# Patient Record
Sex: FEMALE | Race: BLACK OR AFRICAN AMERICAN | NOT HISPANIC OR LATINO | Employment: FULL TIME | URBAN - METROPOLITAN AREA
[De-identification: names, ages, dates, MRNs, and addresses within clinical notes are randomized per-mention and may not be internally consistent; named-entity substitution may affect disease eponyms.]

---

## 2017-01-13 ENCOUNTER — LAB VISIT (OUTPATIENT)
Dept: LAB | Facility: HOSPITAL | Age: 57
End: 2017-01-13
Attending: INTERNAL MEDICINE
Payer: COMMERCIAL

## 2017-01-13 DIAGNOSIS — I10 ESSENTIAL HYPERTENSION: ICD-10-CM

## 2017-01-13 DIAGNOSIS — Z95.1 S/P CABG X 2: ICD-10-CM

## 2017-01-13 DIAGNOSIS — I25.10 CAD, MULTIPLE VESSEL: ICD-10-CM

## 2017-01-13 DIAGNOSIS — E78.5 HYPERLIPIDEMIA, UNSPECIFIED HYPERLIPIDEMIA TYPE: ICD-10-CM

## 2017-01-13 LAB
ALBUMIN SERPL BCP-MCNC: 3.3 G/DL
ALP SERPL-CCNC: 133 U/L
ALT SERPL W/O P-5'-P-CCNC: 12 U/L
ANION GAP SERPL CALC-SCNC: 6 MMOL/L
AST SERPL-CCNC: 21 U/L
BASOPHILS # BLD AUTO: 0.01 K/UL
BASOPHILS NFR BLD: 0.2 %
BILIRUB SERPL-MCNC: 0.2 MG/DL
BUN SERPL-MCNC: 39 MG/DL
CALCIUM SERPL-MCNC: 9.4 MG/DL
CHLORIDE SERPL-SCNC: 115 MMOL/L
CHOLEST/HDLC SERPL: 3 {RATIO}
CO2 SERPL-SCNC: 21 MMOL/L
CREAT SERPL-MCNC: 1.4 MG/DL
DIFFERENTIAL METHOD: ABNORMAL
EOSINOPHIL # BLD AUTO: 0.1 K/UL
EOSINOPHIL NFR BLD: 2 %
ERYTHROCYTE [DISTWIDTH] IN BLOOD BY AUTOMATED COUNT: 14.5 %
EST. GFR  (AFRICAN AMERICAN): 48.8 ML/MIN/1.73 M^2
EST. GFR  (NON AFRICAN AMERICAN): 42.3 ML/MIN/1.73 M^2
ESTIMATED AVG GLUCOSE: 163 MG/DL
GLUCOSE SERPL-MCNC: 75 MG/DL
HBA1C MFR BLD HPLC: 7.3 %
HCT VFR BLD AUTO: 34.1 %
HDL/CHOLESTEROL RATIO: 32.9 %
HDLC SERPL-MCNC: 152 MG/DL
HDLC SERPL-MCNC: 50 MG/DL
HGB BLD-MCNC: 10.8 G/DL
LDLC SERPL CALC-MCNC: 89.8 MG/DL
LYMPHOCYTES # BLD AUTO: 2.5 K/UL
LYMPHOCYTES NFR BLD: 42.2 %
MCH RBC QN AUTO: 26.6 PG
MCHC RBC AUTO-ENTMCNC: 31.7 %
MCV RBC AUTO: 84 FL
MONOCYTES # BLD AUTO: 0.5 K/UL
MONOCYTES NFR BLD: 8.3 %
NEUTROPHILS # BLD AUTO: 2.8 K/UL
NEUTROPHILS NFR BLD: 47.1 %
NONHDLC SERPL-MCNC: 102 MG/DL
PLATELET # BLD AUTO: 199 K/UL
PMV BLD AUTO: 11.2 FL
POTASSIUM SERPL-SCNC: 5.3 MMOL/L
PROT SERPL-MCNC: 7.8 G/DL
RBC # BLD AUTO: 4.06 M/UL
SODIUM SERPL-SCNC: 142 MMOL/L
TRIGL SERPL-MCNC: 61 MG/DL
WBC # BLD AUTO: 5.9 K/UL

## 2017-01-13 PROCEDURE — 80061 LIPID PANEL: CPT

## 2017-01-13 PROCEDURE — 36415 COLL VENOUS BLD VENIPUNCTURE: CPT

## 2017-01-13 PROCEDURE — 80053 COMPREHEN METABOLIC PANEL: CPT

## 2017-01-13 PROCEDURE — 83036 HEMOGLOBIN GLYCOSYLATED A1C: CPT

## 2017-01-13 PROCEDURE — 85025 COMPLETE CBC W/AUTO DIFF WBC: CPT

## 2017-01-25 ENCOUNTER — TELEPHONE (OUTPATIENT)
Dept: CARDIOLOGY | Facility: CLINIC | Age: 57
End: 2017-01-25

## 2017-01-25 DIAGNOSIS — I25.10 CORONARY ARTERY DISEASE, ANGINA PRESENCE UNSPECIFIED, UNSPECIFIED VESSEL OR LESION TYPE, UNSPECIFIED WHETHER NATIVE OR TRANSPLANTED HEART: Primary | ICD-10-CM

## 2017-01-25 NOTE — TELEPHONE ENCOUNTER
----- Message from Fabiana Merchant MD sent at 1/13/2017 10:11 AM CST -----  Potassium is slightly elevated. Please ask her to avoid high potassium foods such as bananas, potatoes and citrus fruits. She will need a repeat BMP prior to her appointment with Dr Hernandez on 2/3. If her potassium remains elevated, we may need to decrease or discontinue her lisinopril.

## 2017-02-10 ENCOUNTER — OFFICE VISIT (OUTPATIENT)
Dept: CARDIOLOGY | Facility: CLINIC | Age: 57
End: 2017-02-10
Payer: COMMERCIAL

## 2017-02-10 ENCOUNTER — TELEPHONE (OUTPATIENT)
Dept: CARDIOLOGY | Facility: CLINIC | Age: 57
End: 2017-02-10

## 2017-02-10 ENCOUNTER — LAB VISIT (OUTPATIENT)
Dept: LAB | Facility: HOSPITAL | Age: 57
End: 2017-02-10
Attending: INTERNAL MEDICINE
Payer: COMMERCIAL

## 2017-02-10 VITALS
HEIGHT: 68 IN | WEIGHT: 205 LBS | DIASTOLIC BLOOD PRESSURE: 83 MMHG | BODY MASS INDEX: 31.07 KG/M2 | SYSTOLIC BLOOD PRESSURE: 187 MMHG | HEART RATE: 76 BPM

## 2017-02-10 DIAGNOSIS — I25.10 CAD, MULTIPLE VESSEL: ICD-10-CM

## 2017-02-10 DIAGNOSIS — I10 ESSENTIAL HYPERTENSION: Primary | ICD-10-CM

## 2017-02-10 DIAGNOSIS — E87.5 HYPERKALEMIA: Primary | ICD-10-CM

## 2017-02-10 DIAGNOSIS — I25.10 CORONARY ARTERY DISEASE, ANGINA PRESENCE UNSPECIFIED, UNSPECIFIED VESSEL OR LESION TYPE, UNSPECIFIED WHETHER NATIVE OR TRANSPLANTED HEART: ICD-10-CM

## 2017-02-10 DIAGNOSIS — E78.5 HYPERLIPIDEMIA, UNSPECIFIED HYPERLIPIDEMIA TYPE: ICD-10-CM

## 2017-02-10 LAB
ANION GAP SERPL CALC-SCNC: 4 MMOL/L
BUN SERPL-MCNC: 27 MG/DL
CALCIUM SERPL-MCNC: 9.5 MG/DL
CHLORIDE SERPL-SCNC: 115 MMOL/L
CO2 SERPL-SCNC: 21 MMOL/L
CREAT SERPL-MCNC: 1.3 MG/DL
EST. GFR  (AFRICAN AMERICAN): 53.4 ML/MIN/1.73 M^2
EST. GFR  (NON AFRICAN AMERICAN): 46.3 ML/MIN/1.73 M^2
GLUCOSE SERPL-MCNC: 144 MG/DL
POTASSIUM SERPL-SCNC: 6.1 MMOL/L
SODIUM SERPL-SCNC: 140 MMOL/L

## 2017-02-10 PROCEDURE — 99999 PR PBB SHADOW E&M-EST. PATIENT-LVL III: CPT | Mod: PBBFAC,,, | Performed by: INTERNAL MEDICINE

## 2017-02-10 PROCEDURE — 3077F SYST BP >= 140 MM HG: CPT | Mod: S$GLB,,, | Performed by: INTERNAL MEDICINE

## 2017-02-10 PROCEDURE — 99213 OFFICE O/P EST LOW 20 MIN: CPT | Mod: S$GLB,,, | Performed by: INTERNAL MEDICINE

## 2017-02-10 PROCEDURE — 3079F DIAST BP 80-89 MM HG: CPT | Mod: S$GLB,,, | Performed by: INTERNAL MEDICINE

## 2017-02-10 PROCEDURE — 36415 COLL VENOUS BLD VENIPUNCTURE: CPT

## 2017-02-10 PROCEDURE — 80048 BASIC METABOLIC PNL TOTAL CA: CPT

## 2017-02-10 RX ORDER — INSULIN LISPRO 100 [IU]/ML
INJECTION, SOLUTION INTRAVENOUS; SUBCUTANEOUS
Qty: 1 BOX | Refills: 6 | Status: CANCELLED | OUTPATIENT
Start: 2017-02-10

## 2017-02-10 RX ORDER — HYDROCHLOROTHIAZIDE 25 MG/1
25 TABLET ORAL DAILY
Qty: 90 TABLET | Refills: 3 | Status: SHIPPED | OUTPATIENT
Start: 2017-02-10 | End: 2022-07-31 | Stop reason: SDUPTHER

## 2017-02-10 NOTE — MR AVS SNAPSHOT
[facility]  [address]  [facility] [address]  Phone: [phone]                  Ce Alicea   2/10/2017 1:00 PM   Office Visit    Description:  Female : 1960   Provider:  Jaden Fink MD   Department:  [facility]           Reason for Visit     Coronary Artery Disease           Diagnoses this Visit        Comments    Essential hypertension    -  Primary     CAD, multiple vessel         Hyperlipidemia, unspecified hyperlipidemia type                To Do List           Future Appointments        Provider Department Dept Phone    2/10/2017 2:25 PM LAB, APPOINTMENT [place] [facility] [phone]    3/31/2017 9:30 AM ECHO, [facility] - Echo/Stress [facility] [phone]    3/31/2017 1:00 PM MD [facility] [phone]      Goals (5 Years of Data)     None      Follow-Up and Disposition     Return in about 4 weeks (around 3/10/2017).    Follow-up and Disposition History       These Medications        Disp Refills Start End    hydrochlorothiazide (HYDRODIURIL) 25 MG tablet 90 tablet 3 2/10/2017 2/10/2018    Take 1 tablet (25 mg total) by mouth once daily. - Oral    Pharmacy: [facility] JOSE DANIEL [address] #: [phone]         [facility]     [facility] Nurse Care Line -  Assistance  Registered nurses in the [facility] Center provide clinical advisement, health education, appointment booking, and other advisory services.  Call for this free service at [phone].             Medications           Message regarding Medications     Verify the changes and/or additions to your medication regime listed below are the same as discussed with your clinician today.  If any of these changes or additions are incorrect, please notify your healthcare provider.        START taking these NEW medications        Refills    hydrochlorothiazide  "(HYDRODIURIL) 25 MG tablet 3    Sig: Take 1 tablet (25 mg total) by mouth once daily.    Class: Normal    Route: Oral           Verify that the below list of medications is an accurate representation of the medications you are currently taking.  If none reported, the list may be blank. If incorrect, please contact your healthcare provider. Carry this list with you in case of emergency.           Current Medications     amlodipine (NORVASC) 10 MG tablet Take 1 tablet (10 mg total) by mouth once daily.    aspirin (ECOTRIN) 81 MG EC tablet Take 1 tablet (81 mg total) by mouth once daily.    atorvastatin (LIPITOR) 80 MG tablet Take 1 tablet (80 mg total) by mouth once daily. DO NOT TAKE UNTIL FOLLOW UP WITH HEPATOLOGY.    blood sugar diagnostic (ONETOUCH VERIO) Strp 1 strip by Misc.(Non-Drug; Combo Route) route 3 (three) times daily.    carvedilol (COREG) 3.125 MG tablet Take 1 tablet (3.125 mg total) by mouth 2 (two) times daily.    HUMALOG KWIKPEN 100 unit/mL InPn pen INJECT 4 UNITS INTO THE SKIN WITH EACH MEAL PLUS CORRECTION SCALE (180-230+1, 231-280+2, 281-330+3, 331-380+4, > 380+5    lancets (ONETOUCH DELICA LANCETS) 33 gauge Misc 1 lancet by Misc.(Non-Drug; Combo Route) route 3 (three) times daily.    LEVEMIR FLEXTOUCH 100 unit/mL (3 mL) InPn pen INJECT 20 UNITS SUB-Q ONCE DAILY IN THE EVENING    lisinopril (PRINIVIL,ZESTRIL) 20 MG tablet Take 1 tablet (20 mg total) by mouth 2 (two) times daily.    pen needle, diabetic (BD ULTRA-FINE PEDRO PEN NEEDLES) 32 gauge x 5/32" Ndle 1 Device by Misc.(Non-Drug; Combo Route) route 4 (four) times daily before meals and nightly.    hydrochlorothiazide (HYDRODIURIL) 25 MG tablet Take 1 tablet (25 mg total) by mouth once daily.           Clinical Reference Information           Your Vitals Were     BP Pulse Height Weight Last Period BMI    187/83 (BP Location: Left arm, Patient Position: Sitting, BP Method: Automatic) 76 5' 8" (1.727 m) 93 kg (205 lb 0.4 oz) (LMP Unknown) 31.17 " kg/m2      Blood Pressure          Most Recent Value    Right Arm BP - Sitting  187/81    Left Arm BP - Sitting  187/83    BP  (!)  187/83      Allergies as of 2/10/2017     Pneumococcal 23-gabriela Ps Vaccine      Immunizations Administered on Date of Encounter - 2/10/2017     None      Orders Placed During Today's Visit     Future Labs/Procedures Expected by Expires    2D Echo w/ Color Flow Doppler  As directed 2/10/2018      [facility] Sign-Up     Activating your [facility] account is as easy as 1-2-3!     1) Visit [email or web address], select Sign Up Now, enter this activation code and your date of birth, then select Next.  [email or web address]  Expires: 2/13/2017  1:50 PM      2) Create a username and password to use when you visit [facility] in the future and select a security question in case you lose your password and select Next.    3) Enter your e-mail address and click Sign Up!    Additional Information  If you have questions, please e-mail [email or web address] or call [phone] to talk to our [facility] staff. Remember, [facility] is NOT to be used for urgent needs. For medical emergencies, dial 911.         Instructions    It was nice seeing you today. I have added HCTZ 25 mg Daily to your regimen and have sent to prescription to your pharmacy. Also have your BMP drawn as we discussed. In addition, I have ordered an echocardiogram, please have this done before your next clinic visit with me.    Also remember to bring your blood pressure cuff and log with you at your next clinic visit.       Language Assistance Services     ATTENTION: Language assistance services are available, free of charge. Please call [phone].      ATENCIÓN: Si habla español, tiene a reinoso disposición servicios gratuitos de asistencia lingüística. Llame al [phone].     DAWOOD Ý: N?u b?n nói Ti?ng Vi?t, có các d?ch v? h? tr? ngôn ng? mi?n phí dành cho b?n. G?i s? [phone].         [facility] complies with applicable Federal  civil rights laws and does not discriminate on the basis of race, color, national origin, age, disability, or sex.

## 2017-02-10 NOTE — PATIENT INSTRUCTIONS
It was nice seeing you today. I have added HCTZ 25 mg Daily to your regimen and have sent to prescription to your pharmacy. Also have your BMP drawn as we discussed. In addition, I have ordered an echocardiogram, please have this done before your next clinic visit with me.    Also remember to bring your blood pressure cuff and log with you at your next clinic visit.

## 2017-02-10 NOTE — TELEPHONE ENCOUNTER
----- Message from Janie Davies sent at 2/10/2017  2:51 PM CST -----  Contact: Patient  Patient need a Rx refill for HUMALOG KWIKPEN 100 unit/mL InPn pen    Please send Rx to [facility] [phone] (Phone)  [phone] (Fax)    [facility]    Please call patient at [phone]

## 2017-02-10 NOTE — TELEPHONE ENCOUNTER
I called patient this afternoon after her clinic visit with me. Patient had a BMP drawn after seeing me, K came back at 6.1, higher than previous drawn last month at 5.3. Advised to stop lisinopril this weekend, avoid foods with high potassium. Patient will continue all other meds, will re-check BMP on Monday.    I will follow up with her next week after her BMP to determine an alternative regimen for her blood pressure.    Signed:  Jaden Fink MD  Cardiology Fellow - PGY4  Pager: [phone]  2/10/2017 5:09 PM

## 2017-02-10 NOTE — PROGRESS NOTES
I have personally taken the history and examined this patient and agree with the Fellow's note as stated above.    Needs better bp control if she can consistently walk into doctor's office with bp in 180 range.  Would add hctz 25 - if bp still up, then titrate carvedilol upward.

## 2017-02-10 NOTE — PROGRESS NOTES
Cardiology Clinic Note  Reason for Visit: Routine follow-up    HPI:   Patient is a 55 y/o F w/ PMHx CAD s/p 2V CABG in  (LIMA-LAD, SVG-PDA), NSTEMI s/p PCI with TIM to mid RCA in 2015, HTN, DM2, who presents to our clinic for routine follow up. Patient last saw me in clinic in late 2016.     Her B/P is still elevated today at 187/81, but she states that it was 130-140/60 at home this morning. Previous clinic visits also show elevated B/P, and plan was for her to bring in her B/P log and cuff from home to compare to the office cuff. She has forgotten to bring both today. Labs last month showed an elevated K to 5.3, but she has not had a repeat BMP after being advised by Dr. Merchant to have one drawn before her visit with me today.     Patient states she feels well otherwise, denies any recent chest discomfort, shortness of breath, palpitations, PND, or orthopnea. She has no limitation with exertion. She denies any dizziness or headaches.     Her medications include Coreg, Norvasc, and Lisinopril for HTN, and she has been taking ASA. I stopped her Brilinta at her last clinic visit as it had been more than a year since her TIM and she had stopped taking the Brilinta several months prior.      ROS:    Constitution: Negative for fever, chills, weight loss or gain.   HENT: Negative for sore throat, rhinorrhea, or headache.  Eyes: Negative for blurred or double vision.   Cardiovascular: See above  Pulmonary: Negative for SOB   Gastrointestinal: Negative for abdominal pain, nausea, vomiting, or diarrhea.   : Negative for dysuria.   Neurological: Negative for focal weakness or sensory changes.  PMH:     Past Medical History   Diagnosis Date    CAD (coronary artery disease)     CAD, multiple vessel 10/28/2014    Diabetes mellitus     Encounter for blood transfusion     Hyperlipidemia     Hypertension      Past Surgical History   Procedure Laterality Date     section      Coronary artery  "bypass graft  12/2014     x 2    Cardiac surgery      Cardiac catheterization  05/22/15      x 1     Allergies:     Review of patient's allergies indicates:   Allergen Reactions    Pneumococcal 23-gabriela ps vaccine      Medications:     Current Outpatient Prescriptions on File Prior to Visit   Medication Sig Dispense Refill    amlodipine (NORVASC) 10 MG tablet Take 1 tablet (10 mg total) by mouth once daily. 90 tablet 3    aspirin (ECOTRIN) 81 MG EC tablet Take 1 tablet (81 mg total) by mouth once daily.  0    atorvastatin (LIPITOR) 80 MG tablet Take 1 tablet (80 mg total) by mouth once daily. DO NOT TAKE UNTIL FOLLOW UP WITH HEPATOLOGY. 90 tablet 3    blood sugar diagnostic (ONETOUCH VERIO) Strp 1 strip by Misc.(Non-Drug; Combo Route) route 3 (three) times daily. 200 strip 12    carvedilol (COREG) 3.125 MG tablet Take 1 tablet (3.125 mg total) by mouth 2 (two) times daily. 180 tablet 3    HUMALOG KWIKPEN 100 unit/mL InPn pen INJECT 4 UNITS INTO THE SKIN WITH EACH MEAL PLUS CORRECTION SCALE (180-230+1, 231-280+2, 281-330+3, 331-380+4, > 380+5 1 Box 6    lancets (ONETOUCH DELICA LANCETS) 33 gauge Misc 1 lancet by Misc.(Non-Drug; Combo Route) route 3 (three) times daily. 200 each 12    LEVEMIR FLEXTOUCH 100 unit/mL (3 mL) InPn pen INJECT 20 UNITS SUB-Q ONCE DAILY IN THE EVENING 1 Box 0    lisinopril (PRINIVIL,ZESTRIL) 20 MG tablet Take 1 tablet (20 mg total) by mouth 2 (two) times daily. 180 tablet 3    pen needle, diabetic (BD ULTRA-FINE PEDRO PEN NEEDLES) 32 gauge x 5/32" Ndle 1 Device by Misc.(Non-Drug; Combo Route) route 4 (four) times daily before meals and nightly. 200 each 6     No current facility-administered medications on file prior to visit.      Social History:     Social History   Substance Use Topics    Smoking status: Never Smoker    Smokeless tobacco: Not on file    Alcohol use No     Family History:     Family History   Problem Relation Age of Onset    Diabetes Mother     Hypertension " "Mother     Diabetes Brother     Heart disease Neg Hx     Heart attack Neg Hx     Cirrhosis Neg Hx      Physical Exam:     Visit Vitals    BP (!) 187/83 (BP Location: Left arm, Patient Position: Sitting, BP Method: Automatic)    Pulse 76    Ht 5' 8" (1.727 m)    Wt 93 kg (205 lb 0.4 oz)    LMP  (LMP Unknown)    BMI 31.17 kg/m2      Constitutional: NAD, conversant  HEENT: Sclera anicteric, PERRLA, EOMI  Neck: No JVD, no carotid bruits  CV: RRR, 2/6 systolic ejection murmur heard best along RSB, also has a prominent heave along her sternum, normal S1/S2  Pulm: CTAB, no wheezes, rales, or ronchi  GI: Abdomen soft, NTND, +BS  Extremities: Trace LE edema, warm and well perfused  Skin: No ecchymosis, erythema, or ulcers  Psych: AOx3, appropriate affect  Neuro: CNII-XII intact, no focal deficits    Labs:     Lab Results   Component Value Date     01/13/2017    K 5.3 (H) 01/13/2017     (H) 01/13/2017    CO2 21 (L) 01/13/2017    BUN 39 (H) 01/13/2017    CREATININE 1.4 01/13/2017    ANIONGAP 6 (L) 01/13/2017     Lab Results   Component Value Date    HGBA1C 7.3 (H) 01/13/2017     Lab Results   Component Value Date     (H) 10/17/2015     (H) 10/26/2014    Lab Results   Component Value Date    WBC 5.90 01/13/2017    HGB 10.8 (L) 01/13/2017    HCT 34.1 (L) 01/13/2017    HCT 19 (LL) 12/01/2014     01/13/2017    GRAN 2.8 01/13/2017    GRAN 47.1 01/13/2017     Lab Results   Component Value Date    CHOL 152 01/13/2017    HDL 50 01/13/2017    LDLCALC 89.8 01/13/2017    TRIG 61 01/13/2017          Imaging:     EF   Date Value Ref Range Status   10/19/2015 60 55 - 65    12/02/2014 60 55 - 65    10/27/2014 55 55 - 65      Assessment:   Patient is a 57 y/o F w/ PMHx CAD s/p 2V CABG in 2014 (LIMA-LAD, SVG-PDA), NSTEMI s/p PCI with TIM to mid RCA in 5/2015, HTN, HLD, DM2, who presents to our clinic for routine follow up.     1. CAD s/p 2V CABG in 2014(LIMA-LAD, SVG-PDA), PCI with TIM to mid RCA in " 5/2015; currently taking ASA 81 mg Daily and Lipitor 80 mg Daily; EF preserved per TTE in 10/2015  2. HTN, elevated today in clinic, asymptomatic; currently on Coreg 3.125 mg BID, Lisinopril 20 mg BID, and Norvasc 10 mg Daily  3. HLD, on Lipitor as above  4. DM2 on insulin    Plan:   1. Check BMP as previously requested.  2. Will check TTE as it has been > 1 year since last, given CAD history, peristently elevated B/P, and murmur on exam  3. Will add HCTZ 25 mg Daily to her regimen.  4. Continue other medications as above.  5. Request blood pressure log and cuff to be brought to next clinic appointment to determine efficacy of HTN treatement.    Follow up in 1 month. Discussed with Dr. Arreguin.    Signed:  Jaden Fink MD  Cardiology Fellow, PGY-4  Pager: [phone]  2/10/2017 1:53 PM

## 2017-02-13 RX ORDER — INSULIN LISPRO 100 [IU]/ML
INJECTION, SOLUTION INTRAVENOUS; SUBCUTANEOUS
Qty: 1 BOX | Refills: 6 | Status: SHIPPED | OUTPATIENT
Start: 2017-02-13 | End: 2022-07-31 | Stop reason: SDUPTHER

## 2018-03-09 RX ORDER — CARVEDILOL 3.12 MG/1
3.12 TABLET ORAL 2 TIMES DAILY
Qty: 180 TABLET | Refills: 3 | Status: SHIPPED | OUTPATIENT
Start: 2018-03-09 | End: 2022-07-31 | Stop reason: SDUPTHER

## 2018-03-20 ENCOUNTER — TELEPHONE (OUTPATIENT)
Dept: CARDIOLOGY | Facility: CLINIC | Age: 58
End: 2018-03-20

## 2018-03-21 NOTE — TELEPHONE ENCOUNTER
Asked the pt to please call the office back b/c her [facility] is trying to fill the Lisinopril Rx yet it has been discontinued.Noticed when  had discontinued it he had asked that she get labs the following Mon and It does not she has been back since.

## 2020-03-27 ENCOUNTER — HOSPITAL ENCOUNTER (EMERGENCY)
Facility: HOSPITAL | Age: 60
Discharge: HOME | End: 2020-03-27
Attending: EMERGENCY MEDICINE
Payer: OTHER GOVERNMENT

## 2020-03-27 VITALS
OXYGEN SATURATION: 98 % | DIASTOLIC BLOOD PRESSURE: 94 MMHG | HEART RATE: 80 BPM | RESPIRATION RATE: 16 BRPM | TEMPERATURE: 99 F | WEIGHT: 200 LBS | HEIGHT: 68 IN | BODY MASS INDEX: 30.31 KG/M2 | SYSTOLIC BLOOD PRESSURE: 212 MMHG

## 2020-03-27 DIAGNOSIS — I10 HYPERTENSION, UNSPECIFIED TYPE: ICD-10-CM

## 2020-03-27 DIAGNOSIS — B34.9 ACUTE VIRAL SYNDROME: ICD-10-CM

## 2020-03-27 DIAGNOSIS — Z20.822 SUSPECTED COVID-19 VIRUS INFECTION: ICD-10-CM

## 2020-03-27 DIAGNOSIS — R05.9 COUGH: ICD-10-CM

## 2020-03-27 DIAGNOSIS — R50.9 FEVER, UNSPECIFIED FEVER CAUSE: Primary | ICD-10-CM

## 2020-03-27 PROCEDURE — 99284 EMERGENCY DEPT VISIT MOD MDM: CPT | Mod: ,,, | Performed by: PHYSICIAN ASSISTANT

## 2020-03-27 PROCEDURE — 25000003 PHARM REV CODE 250: Performed by: PHYSICIAN ASSISTANT

## 2020-03-27 PROCEDURE — 99284 PR EMERGENCY DEPT VISIT,LEVEL IV: ICD-10-PCS | Mod: ,,, | Performed by: PHYSICIAN ASSISTANT

## 2020-03-27 PROCEDURE — U0002 COVID-19 LAB TEST NON-CDC: HCPCS

## 2020-03-27 PROCEDURE — 99284 EMERGENCY DEPT VISIT MOD MDM: CPT | Mod: 25

## 2020-03-27 RX ORDER — ACETAMINOPHEN 500 MG
1000 TABLET ORAL
Status: COMPLETED | OUTPATIENT
Start: 2020-03-27 | End: 2020-03-27

## 2020-03-27 RX ORDER — AMLODIPINE BESYLATE 10 MG/1
10 TABLET ORAL DAILY
Qty: 30 TABLET | Refills: 0 | Status: SHIPPED | OUTPATIENT
Start: 2020-03-27 | End: 2022-07-31 | Stop reason: SDUPTHER

## 2020-03-27 RX ORDER — AMLODIPINE BESYLATE 10 MG/1
10 TABLET ORAL
Status: COMPLETED | OUTPATIENT
Start: 2020-03-27 | End: 2020-03-27

## 2020-03-27 RX ORDER — GUAIFENESIN/DEXTROMETHORPHAN 100-10MG/5
5 SYRUP ORAL 4 TIMES DAILY PRN
Qty: 120 ML | Refills: 0 | COMMUNITY
Start: 2020-03-27 | End: 2020-04-06

## 2020-03-27 RX ORDER — ACETAMINOPHEN 500 MG
1000 TABLET ORAL EVERY 8 HOURS
Refills: 0 | COMMUNITY
Start: 2020-03-27 | End: 2020-04-03

## 2020-03-27 RX ADMIN — ACETAMINOPHEN 1000 MG: 500 TABLET ORAL at 03:03

## 2020-03-27 RX ADMIN — AMLODIPINE BESYLATE 10 MG: 10 TABLET ORAL at 04:03

## 2020-03-27 NOTE — DISCHARGE INSTRUCTIONS
You are exhibiting some symptoms of corona virus and your COVID19 test is pending.  Listed below are measures you should take for your health and other's health:  1) stay home and isolate yourself for 2 weeks from symptoms onset. If you must go out, avoid using any kind of public transportation, ridesharing, or taxis.  2) monitor your symptoms.  Check your temperature.  If your temperature is greater than 100.4 take Tylenol.  3) stay hydrated and rest.  4) cover your cough and sneezes.  5) wash your hands often for at least 20 sec or use hand  that contains at least 60% alcohol.  6) stay away from other people at home.  Use separate bathroom if available.  If within 6 feet, wear a facemask.  7) avoid sharing personal items like dishes, towels and bedding.  Clean surfaces often (like counters, tabletops, and doorknobs).  8) Go to www.cdc.gov/covid19-symptoms or call [phone] (nurse on call) or 1-259.554.8730 (Patient hotline)  9)*For medical emergencies, call 911 and notify the dispatch personnel that you have or may have COVID-19.*

## 2020-03-28 LAB — SARS-COV-2 RNA RESP QL NAA+PROBE: DETECTED

## 2021-02-01 ENCOUNTER — CLINICAL SUPPORT (OUTPATIENT)
Dept: URGENT CARE | Facility: CLINIC | Age: 61
End: 2021-02-01
Payer: COMMERCIAL

## 2021-02-01 DIAGNOSIS — Z11.9 ENCOUNTER FOR SCREENING EXAMINATION FOR INFECTIOUS DISEASE: Primary | ICD-10-CM

## 2021-02-01 LAB
CTP QC/QA: YES
SARS-COV-2 RDRP RESP QL NAA+PROBE: NEGATIVE

## 2021-02-01 PROCEDURE — U0002: ICD-10-PCS | Mod: QW,S$GLB,, | Performed by: PHYSICIAN ASSISTANT

## 2021-02-01 PROCEDURE — U0002 COVID-19 LAB TEST NON-CDC: HCPCS | Mod: QW,S$GLB,, | Performed by: PHYSICIAN ASSISTANT

## 2021-07-26 ENCOUNTER — CLINICAL SUPPORT (OUTPATIENT)
Dept: URGENT CARE | Facility: CLINIC | Age: 61
End: 2021-07-26
Payer: COMMERCIAL

## 2021-07-26 DIAGNOSIS — Z78.9 NO KNOWN HEALTH PROBLEMS: Primary | ICD-10-CM

## 2021-07-26 LAB
CTP QC/QA: YES
SARS-COV-2 RDRP RESP QL NAA+PROBE: NEGATIVE

## 2021-07-26 PROCEDURE — U0002 COVID-19 LAB TEST NON-CDC: HCPCS | Mod: QW,S$GLB,, | Performed by: PHYSICIAN ASSISTANT

## 2021-07-26 PROCEDURE — U0002: ICD-10-PCS | Mod: QW,S$GLB,, | Performed by: PHYSICIAN ASSISTANT

## 2022-01-04 ENCOUNTER — OFFICE VISIT (OUTPATIENT)
Dept: URGENT CARE | Facility: CLINIC | Age: 62
End: 2022-01-04
Payer: COMMERCIAL

## 2022-01-04 VITALS
SYSTOLIC BLOOD PRESSURE: 232 MMHG | OXYGEN SATURATION: 99 % | RESPIRATION RATE: 16 BRPM | HEART RATE: 96 BPM | BODY MASS INDEX: 30.31 KG/M2 | HEIGHT: 68 IN | TEMPERATURE: 96 F | WEIGHT: 200 LBS | DIASTOLIC BLOOD PRESSURE: 99 MMHG

## 2022-01-04 DIAGNOSIS — U07.1 LAB TEST POSITIVE FOR DETECTION OF COVID-19 VIRUS: ICD-10-CM

## 2022-01-04 DIAGNOSIS — U07.1 COVID-19 VIRUS DETECTED: ICD-10-CM

## 2022-01-04 DIAGNOSIS — R05.9 COUGH: Primary | ICD-10-CM

## 2022-01-04 LAB
CTP QC/QA: YES
SARS-COV-2 RDRP RESP QL NAA+PROBE: POSITIVE

## 2022-01-04 PROCEDURE — 99213 OFFICE O/P EST LOW 20 MIN: CPT | Mod: S$GLB,,, | Performed by: FAMILY MEDICINE

## 2022-01-04 PROCEDURE — U0002: ICD-10-PCS | Mod: QW,S$GLB,, | Performed by: FAMILY MEDICINE

## 2022-01-04 PROCEDURE — U0002 COVID-19 LAB TEST NON-CDC: HCPCS | Mod: QW,S$GLB,, | Performed by: FAMILY MEDICINE

## 2022-01-04 PROCEDURE — 99213 PR OFFICE/OUTPT VISIT, EST, LEVL III, 20-29 MIN: ICD-10-PCS | Mod: S$GLB,,, | Performed by: FAMILY MEDICINE

## 2022-01-04 RX ORDER — PROMETHAZINE HYDROCHLORIDE AND DEXTROMETHORPHAN HYDROBROMIDE 6.25; 15 MG/5ML; MG/5ML
SYRUP ORAL
Qty: 180 ML | Refills: 0 | Status: SHIPPED | OUTPATIENT
Start: 2022-01-04

## 2022-01-04 RX ORDER — LORATADINE 10 MG/1
10 TABLET ORAL DAILY
Qty: 30 TABLET | Refills: 2 | Status: SHIPPED | OUTPATIENT
Start: 2022-01-04 | End: 2023-01-04

## 2022-01-04 NOTE — PROGRESS NOTES
"Subjective:       Patient ID: Ce Alicea is a 61 y.o. female.    Vitals:  height is 5' 8" (1.727 m) and weight is 90.7 kg (200 lb). Her temperature is 96 °F (35.6 °C). Her blood pressure is 232/99 (abnormal) and her pulse is 96. Her respiration is 16 and oxygen saturation is 99%.     Chief Complaint: URI    COVID exposure.  61-year-old female with the complaint of having sore throat body ache low-grade fever for last 5 days exposed to family member with COVID positive patient is not vaccinated denies any nausea vomiting or    URI   This is a new problem. The current episode started in the past 7 days. The problem has been gradually improving. Associated symptoms include rhinorrhea, sneezing and a sore throat. Pertinent negatives include no congestion, coughing, diarrhea, headaches, nausea or vomiting.       Constitution: Positive for fever. Negative for chills, sweating and fatigue.   HENT: Positive for sore throat. Negative for congestion and trouble swallowing.    Respiratory: Negative for cough.    Gastrointestinal: Negative for nausea, vomiting and diarrhea.   Musculoskeletal: Negative for muscle ache.   Allergic/Immunologic: Positive for sneezing.   Neurological: Negative for headaches.       Objective:      Physical Exam   Constitutional: She is oriented to person, place, and time. She appears well-developed and well-nourished. She is cooperative.  Non-toxic appearance. She does not appear ill. No distress.   HENT:   Head: Normocephalic and atraumatic.   Ears:   Right Ear: Hearing, tympanic membrane, external ear and ear canal normal.   Left Ear: Hearing, tympanic membrane, external ear and ear canal normal.   Nose: Nose normal. No mucosal edema, rhinorrhea or nasal deformity. No epistaxis. Right sinus exhibits no maxillary sinus tenderness and no frontal sinus tenderness. Left sinus exhibits no maxillary sinus tenderness and no frontal sinus tenderness.   Mouth/Throat: Uvula is midline, oropharynx is " clear and moist and mucous membranes are normal. Mucous membranes are moist. No trismus in the jaw. Normal dentition. No uvula swelling. No posterior oropharyngeal erythema. Oropharynx is clear.   Eyes: Conjunctivae and lids are normal. Right eye exhibits no discharge. Left eye exhibits no discharge. No scleral icterus.      extraocular movement intact   Neck: Trachea normal and phonation normal. Neck supple.   Cardiovascular: Normal rate, regular rhythm, normal heart sounds, intact distal pulses and normal pulses.   Pulmonary/Chest: Effort normal and breath sounds normal. No respiratory distress.   Abdominal: Normal appearance and bowel sounds are normal. She exhibits no distension, no pulsatile midline mass and no mass. Soft. There is no abdominal tenderness.   Musculoskeletal: Normal range of motion.         General: No deformity or edema. Normal range of motion.   Neurological: She is alert and oriented to person, place, and time. She exhibits normal muscle tone. Coordination normal.   Skin: Skin is warm, dry, intact, not diaphoretic and not pale.   Psychiatric: She has a normal mood and affect. Her speech is normal and behavior is normal. Judgment and thought content normal. Cognition and memory  Nursing note and vitals reviewed.        Assessment:       1. Cough          Plan:         Cough  -     POCT COVID-19 Rapid Screening                    Results for orders placed or performed in visit on 01/04/22   POCT COVID-19 Rapid Screening   Result Value Ref Range    POC Rapid COVID Positive (A) Negative     Acceptable Yes

## 2022-01-04 NOTE — PATIENT INSTRUCTIONS
Please isolate yourself at home.  You may leave home and/or return to work once the following conditions are met:    If you were not hospitalized and are not moderately to severely immunocompromised:    More than 5 days since symptoms first appeared AND   More than 24 hours fever free without medications AND   Symptoms are improving   Continue to wear a mask around others for 5 additional days.    If you were hospitalized OR are moderately to severely immunocompromised:   More than 20 days since symptoms first appeared   More than 24 hours fever free without medications   Symptoms have improved    If you had no symptoms but tested positive:   More than 5 days since the date of the first positive test (20 days if moderately to severely immunocompromised). If you develop symptoms, then use the guidelines above.   Continue to wear a mask around others for 5 additional days.      Contact Tracing    As one of the next steps, you will receive a call or text from the [place] Department of Health ([facility]) COVID-19 Tracing Team. See the contact information below so you know not to ignore the health departments call. It is important that you contact them back immediately so they can help.      Contact Tracer Number:  [phone]  Caller ID for most carriers: [facility]     What is contact tracing?  · Contact tracing is a process that helps identify everyone who has been in close contact with an infected person. Contact tracers let those people know they may have been exposed and guide them on next steps. Confidentiality is important for everyone; no one will be told who may have exposed them to the virus.  · Your involvement is important. The more we know about where and how this virus is spreading, the better chance we have at stopping it from spreading further.  What does exposure mean?  · Exposure means you have been within 6 feet for more than 15 minutes with a person who has or had COVID-19.  What kind of  questions do the contact tracers ask?  · A contact tracer will confirm your basic contact information including name, address, phone number, and next of kin, as well as asking about any symptoms you may have had. Theyll also ask you how you think you may have gotten sick, such as places where you may have been exposed to the virus, and people you were with. Those names will never be shared with anyone outside of that call, and will only be used to help trace and stop the spread of the virus.   I have privacy concerns. How will the state use my information?  · Your privacy about your health is important. All calls are completed using call centers that use the appropriate health privacy protection measures (HIPAA compliance), meaning that your patient information is safe. No one will ever ask you any questions related to immigration status. Your health comes first.   Do I have to participate?  · You do not have to participate, but we strongly encourage you to. Contact tracing can help us catch and control new outbreaks as theyre developing to keep your friends and family safe.   What if I dont hear from anyone?  · If you dont receive a call within 24 hours, you can call the number above right away to inquire about your status. That line is open from 8:00 am - 8:00 p.m., 7 days a week.  Contact tracing saves lives! Together, we have the power to beat this virus and keep our loved ones and neighbors safe.    For more information see CDC link below.      https://www.cdc.gov/coronavirus/2019-ncov/hcp/guidance-prevent-spread.html#precautions        Sources:  [facility], [place] Department [facility] and [facility]

## 2022-01-11 ENCOUNTER — LAB VISIT (OUTPATIENT)
Dept: PRIMARY CARE CLINIC | Facility: CLINIC | Age: 62
End: 2022-01-11
Payer: COMMERCIAL

## 2022-01-11 DIAGNOSIS — Z20.822 CONTACT WITH AND (SUSPECTED) EXPOSURE TO COVID-19: ICD-10-CM

## 2022-01-11 LAB
CTP QC/QA: YES
SARS-COV-2 AG RESP QL IA.RAPID: NEGATIVE

## 2022-01-11 PROCEDURE — 87811 SARS-COV-2 COVID19 W/OPTIC: CPT

## 2022-07-22 ENCOUNTER — OFFICE VISIT (OUTPATIENT)
Dept: URGENT CARE | Facility: CLINIC | Age: 62
End: 2022-07-22
Payer: COMMERCIAL

## 2022-07-22 VITALS
HEART RATE: 102 BPM | RESPIRATION RATE: 18 BRPM | BODY MASS INDEX: 34.41 KG/M2 | DIASTOLIC BLOOD PRESSURE: 82 MMHG | HEIGHT: 68 IN | SYSTOLIC BLOOD PRESSURE: 144 MMHG | WEIGHT: 227.06 LBS | TEMPERATURE: 99 F | OXYGEN SATURATION: 96 %

## 2022-07-22 DIAGNOSIS — B34.9 VIRAL SYNDROME: Primary | ICD-10-CM

## 2022-07-22 DIAGNOSIS — R05.9 COUGH: ICD-10-CM

## 2022-07-22 LAB
CTP QC/QA: YES
SARS-COV-2 RDRP RESP QL NAA+PROBE: NEGATIVE

## 2022-07-22 PROCEDURE — 99213 OFFICE O/P EST LOW 20 MIN: CPT | Mod: S$GLB,,, | Performed by: PHYSICIAN ASSISTANT

## 2022-07-22 PROCEDURE — U0002: ICD-10-PCS | Mod: QW,S$GLB,, | Performed by: PHYSICIAN ASSISTANT

## 2022-07-22 PROCEDURE — 99213 PR OFFICE/OUTPT VISIT, EST, LEVL III, 20-29 MIN: ICD-10-PCS | Mod: S$GLB,,, | Performed by: PHYSICIAN ASSISTANT

## 2022-07-22 PROCEDURE — U0002 COVID-19 LAB TEST NON-CDC: HCPCS | Mod: QW,S$GLB,, | Performed by: PHYSICIAN ASSISTANT

## 2022-07-22 NOTE — PROGRESS NOTES
"Subjective:       Patient ID: Ce Alicea is a 61 y.o. female.    Vitals:  height is 5' 8" (1.727 m) and weight is 103 kg (227 lb 1.2 oz). Her oral temperature is 98.8 °F (37.1 °C). Her blood pressure is 144/82 (abnormal) and her pulse is 102. Her respiration is 18 and oxygen saturation is 96%.     Chief Complaint: Fever    Ms. Alicea presents for evaluation of cough, subjective fever, stomach upset, nausea, vomiting, diarrhea that started on Sunday.  She had 1 episode of vomiting on Monday.  She has had several episodes of diarrhea over the past few days.  Her  is now experiencing similar symptoms.  She denies any  headache, sore throat, congestion, shortness of breath, chest pain, leg swelling, anosmia or ageusia.  She has taken Pepto-Bismol for her symptoms with some relief.  Overall, her symptoms have significantly improved since Sunday.        Fever   This is a new problem. The current episode started in the past 7 days. The problem occurs constantly. The problem has been unchanged. Her temperature was unmeasured prior to arrival. Associated symptoms include abdominal pain, coughing, diarrhea, nausea and vomiting. Pertinent negatives include no chest pain, congestion, ear pain, headaches, rash, sore throat, urinary pain or wheezing. The treatment provided mild relief.       Constitution: Positive for fever. Negative for appetite change, chills, sweating and fatigue.   HENT: Negative for ear pain, ear discharge, hearing loss, drooling, congestion, postnasal drip, sinus pain, sinus pressure and sore throat.    Neck: Negative for neck stiffness and painful lymph nodes.   Cardiovascular: Negative for chest trauma, chest pain, leg swelling, palpitations, sob on exertion and passing out.   Eyes: Negative for eye pain and blurred vision.   Respiratory: Positive for cough. Negative for chest tightness, sputum production, shortness of breath and wheezing.    Gastrointestinal: Positive for abdominal " pain, nausea, vomiting and diarrhea. Negative for abdominal bloating, history of abdominal surgery and constipation.   Genitourinary: Negative for dysuria, frequency and urgency.   Musculoskeletal: Negative for joint pain, joint swelling, muscle cramps and muscle ache.   Skin: Negative for rash.   Allergic/Immunologic: Negative for itching and sneezing.   Neurological: Negative for dizziness, history of vertigo, light-headedness, passing out, facial drooping, speech difficulty, coordination disturbances, loss of balance, headaches and altered mental status.   Hematologic/Lymphatic: Negative for swollen lymph nodes and easy bruising/bleeding. Does not bruise/bleed easily.   Psychiatric/Behavioral: Negative for altered mental status.       Objective:      Physical Exam   Constitutional: She is oriented to person, place, and time. She appears well-developed. She is cooperative.  Non-toxic appearance. She does not appear ill. No distress.   HENT:   Head: Normocephalic and atraumatic.   Ears:   Right Ear: Hearing, tympanic membrane, external ear and ear canal normal.   Left Ear: Hearing, tympanic membrane, external ear and ear canal normal.   Nose: Nose normal. No mucosal edema, rhinorrhea or nasal deformity. No epistaxis. Right sinus exhibits no maxillary sinus tenderness and no frontal sinus tenderness. Left sinus exhibits no maxillary sinus tenderness and no frontal sinus tenderness.   Mouth/Throat: Uvula is midline, oropharynx is clear and moist and mucous membranes are normal. No trismus in the jaw. Normal dentition. No uvula swelling. No oropharyngeal exudate, posterior oropharyngeal edema or posterior oropharyngeal erythema. No tonsillar exudate.   Eyes: Conjunctivae and lids are normal. No scleral icterus.   Neck: Trachea normal and phonation normal. Neck supple. No edema present. No erythema present. No neck rigidity present.   Cardiovascular: Normal rate, regular rhythm, normal heart sounds and normal pulses.    Pulmonary/Chest: Effort normal and breath sounds normal. No stridor. No respiratory distress. She has no decreased breath sounds. She has no wheezes. She has no rhonchi. She has no rales.   Abdominal: Normal appearance. There is no abdominal tenderness. There is no rebound, no guarding, no tenderness at McBurney's point, negative Medrano's sign, no left CVA tenderness, negative Rovsing's sign, negative psoas sign, no right CVA tenderness and negative obturator sign.      Comments: Abd is soft, NT, ND.   Musculoskeletal: Normal range of motion.         General: No deformity. Normal range of motion.   Lymphadenopathy:     She has no cervical adenopathy.   Neurological: She is alert and oriented to person, place, and time. She exhibits normal muscle tone. Coordination normal.   Skin: Skin is warm, dry, intact, not diaphoretic and not pale.   Psychiatric: Her speech is normal and behavior is normal. Judgment and thought content normal.   Nursing note and vitals reviewed.          Results for orders placed or performed in visit on 07/22/22   POCT COVID-19 Rapid Screening   Result Value Ref Range    POC Rapid COVID Negative Negative     Acceptable Yes        Assessment:       1. Viral syndrome    2. Cough          Plan:         Viral syndrome    Cough  -     POCT COVID-19 Rapid Screening    Patient with improving viral symptoms.  Advised daily probiotics.  Diagnoses and plan discussed with the patient, as well as the expected course and duration of her symptoms. All questions and concerns were addressed prior to discharge.  She was advised to follow up with her PCP within 1 week if symptoms do not improve. Emergency department precautions were given. Patient verbalized understanding and was happy with the plan of care.   Note dictated with voice recognition software, please excuse any grammatical errors.    Patient Instructions   PLEASE READ YOUR DISCHARGE INSTRUCTIONS ENTIRELY AS IT CONTAINS IMPORTANT  INFORMATION.  Please start taking daily probiotics until stomach upset is resolved.  - Rest.    - Drink plenty of fluids.    - Tylenol or Ibuprofen as directed as needed for fever/pain.    - If you were prescribed antibiotics, please take them to completion.  - If you are female and on birth control pills - please use additional methods of contraception to prevent pregnancy while on antibiotics and for one cycle after.   - If you were prescribed a narcotic medication, a cough syrup, or a muscle relaxer, do not drive or operate heavy equipment or machinery while taking these medications, as they can cause drowsiness.   - If a referral to a specialty was made today, you should receive a phone call in the next few days to schedule an appt.  Please call [phone] to schedule an appt if have not gotten a phone call in the next few days.  - If you smoke, please stop smoking.  -You must understand that you've received an Urgent Care treatment only and that you may be released before all your medical problems are known or treated. You, the patient, will arrange for follow up care as instructed. Please arrange follow up with your primary medical clinic as soon as possible.   - Follow up with your PCP or specialty clinic as directed in the next 1-2 weeks if not improved or as needed.  You can call [phone] to schedule an appointment with the appropriate provider.    - Please return to Urgent Care or to the Emergency Department if your symptoms worsen.    Patient aware and verbalized understanding.

## 2022-07-22 NOTE — LETTER
July 22, 2022      [facility] - [facility]  [address] [address]  Phone: [phone]  Fax: [phone]       Patient: Ce Alicea   YOB: 1960  Date of Visit: 07/22/2022    To Whom It May Concern:    Aury Alicea  was at [facility] on 07/22/2022. The patient may return to work/school on 7/25/2022 with no restrictions. If you have any questions or concerns, or if I can be of further assistance, please do not hesitate to contact me.    Sincerely,    Lennie Bass PA-C

## 2022-07-22 NOTE — PATIENT INSTRUCTIONS
PLEASE READ YOUR DISCHARGE INSTRUCTIONS ENTIRELY AS IT CONTAINS IMPORTANT INFORMATION.  Please start taking daily probiotics until stomach upset is resolved.  - Rest.    - Drink plenty of fluids.    - Tylenol or Ibuprofen as directed as needed for fever/pain.    - If you were prescribed antibiotics, please take them to completion.  - If you are female and on birth control pills - please use additional methods of contraception to prevent pregnancy while on antibiotics and for one cycle after.   - If you were prescribed a narcotic medication, a cough syrup, or a muscle relaxer, do not drive or operate heavy equipment or machinery while taking these medications, as they can cause drowsiness.   - If a referral to a specialty was made today, you should receive a phone call in the next few days to schedule an appt.  Please call [phone] to schedule an appt if have not gotten a phone call in the next few days.  - If you smoke, please stop smoking.  -You must understand that you've received an Urgent Care treatment only and that you may be released before all your medical problems are known or treated. You, the patient, will arrange for follow up care as instructed. Please arrange follow up with your primary medical clinic as soon as possible.   - Follow up with your PCP or specialty clinic as directed in the next 1-2 weeks if not improved or as needed.  You can call [phone] to schedule an appointment with the appropriate provider.    - Please return to Urgent Care or to the Emergency Department if your symptoms worsen.    Patient aware and verbalized understanding.

## 2022-07-31 ENCOUNTER — HOSPITAL ENCOUNTER (EMERGENCY)
Facility: HOSPITAL | Age: 62
Discharge: HOME | End: 2022-07-31
Attending: EMERGENCY MEDICINE
Payer: COMMERCIAL

## 2022-07-31 VITALS
TEMPERATURE: 99 F | BODY MASS INDEX: 34.4 KG/M2 | DIASTOLIC BLOOD PRESSURE: 71 MMHG | SYSTOLIC BLOOD PRESSURE: 164 MMHG | WEIGHT: 227 LBS | HEIGHT: 68 IN | RESPIRATION RATE: 20 BRPM | HEART RATE: 69 BPM | OXYGEN SATURATION: 98 %

## 2022-07-31 DIAGNOSIS — E11.65 TYPE 2 DIABETES MELLITUS WITH HYPERGLYCEMIA: ICD-10-CM

## 2022-07-31 DIAGNOSIS — N30.01 ACUTE CYSTITIS WITH HEMATURIA: ICD-10-CM

## 2022-07-31 DIAGNOSIS — R11.0 NAUSEA: ICD-10-CM

## 2022-07-31 DIAGNOSIS — R73.9 HYPERGLYCEMIA: ICD-10-CM

## 2022-07-31 DIAGNOSIS — Z76.0 MEDICATION REFILL: ICD-10-CM

## 2022-07-31 DIAGNOSIS — I10 HYPERTENSION: Primary | ICD-10-CM

## 2022-07-31 LAB
ALBUMIN SERPL BCP-MCNC: 3.3 G/DL (ref 3.5–5.2)
ALP SERPL-CCNC: 86 U/L (ref 55–135)
ALT SERPL W/O P-5'-P-CCNC: 12 U/L (ref 10–44)
ANION GAP SERPL CALC-SCNC: 13 MMOL/L (ref 8–16)
AST SERPL-CCNC: 14 U/L (ref 10–40)
B-OH-BUTYR BLD STRIP-SCNC: 1.1 MMOL/L (ref 0–0.5)
BACTERIA #/AREA URNS AUTO: ABNORMAL /HPF
BASOPHILS # BLD AUTO: 0.02 K/UL (ref 0–0.2)
BASOPHILS NFR BLD: 0.2 % (ref 0–1.9)
BILIRUB SERPL-MCNC: 0.4 MG/DL (ref 0.1–1)
BILIRUB UR QL STRIP: NEGATIVE
BNP SERPL-MCNC: 57 PG/ML (ref 0–99)
BUN SERPL-MCNC: 10 MG/DL (ref 6–20)
CALCIUM SERPL-MCNC: 10 MG/DL (ref 8.7–10.5)
CHLORIDE SERPL-SCNC: 108 MMOL/L (ref 95–110)
CLARITY UR REFRACT.AUTO: ABNORMAL
CO2 SERPL-SCNC: 19 MMOL/L (ref 23–29)
COLOR UR AUTO: YELLOW
CREAT SERPL-MCNC: 1.2 MG/DL (ref 0.5–1.4)
DIFFERENTIAL METHOD: ABNORMAL
EOSINOPHIL # BLD AUTO: 0 K/UL (ref 0–0.5)
EOSINOPHIL NFR BLD: 0.4 % (ref 0–8)
ERYTHROCYTE [DISTWIDTH] IN BLOOD BY AUTOMATED COUNT: 14.1 % (ref 11.5–14.5)
EST. GFR  (AFRICAN AMERICAN): 56.8 ML/MIN/1.73 M^2
EST. GFR  (NON AFRICAN AMERICAN): 49.2 ML/MIN/1.73 M^2
GLUCOSE SERPL-MCNC: 256 MG/DL (ref 70–110)
GLUCOSE UR QL STRIP: NEGATIVE
HCT VFR BLD AUTO: 42.3 % (ref 37–48.5)
HGB BLD-MCNC: 13 G/DL (ref 12–16)
HGB UR QL STRIP: ABNORMAL
HYALINE CASTS UR QL AUTO: 8 /LPF
IMM GRANULOCYTES # BLD AUTO: 0.03 K/UL (ref 0–0.04)
IMM GRANULOCYTES NFR BLD AUTO: 0.4 % (ref 0–0.5)
KETONES UR QL STRIP: ABNORMAL
LEUKOCYTE ESTERASE UR QL STRIP: ABNORMAL
LYMPHOCYTES # BLD AUTO: 1.7 K/UL (ref 1–4.8)
LYMPHOCYTES NFR BLD: 20 % (ref 18–48)
MCH RBC QN AUTO: 25.8 PG (ref 27–31)
MCHC RBC AUTO-ENTMCNC: 30.7 G/DL (ref 32–36)
MCV RBC AUTO: 84 FL (ref 82–98)
MICROSCOPIC COMMENT: ABNORMAL
MONOCYTES # BLD AUTO: 0.4 K/UL (ref 0.3–1)
MONOCYTES NFR BLD: 5.2 % (ref 4–15)
NEUTROPHILS # BLD AUTO: 6.2 K/UL (ref 1.8–7.7)
NEUTROPHILS NFR BLD: 73.8 % (ref 38–73)
NITRITE UR QL STRIP: NEGATIVE
NRBC BLD-RTO: 0 /100 WBC
PH UR STRIP: 5 [PH] (ref 5–8)
PLATELET # BLD AUTO: 417 K/UL (ref 150–450)
PMV BLD AUTO: 10.1 FL (ref 9.2–12.9)
POCT GLUCOSE: 187 MG/DL (ref 70–110)
POTASSIUM SERPL-SCNC: 4.4 MMOL/L (ref 3.5–5.1)
PROT SERPL-MCNC: 8.3 G/DL (ref 6–8.4)
PROT UR QL STRIP: ABNORMAL
RBC # BLD AUTO: 5.03 M/UL (ref 4–5.4)
RBC #/AREA URNS AUTO: 4 /HPF (ref 0–4)
SODIUM SERPL-SCNC: 140 MMOL/L (ref 136–145)
SP GR UR STRIP: 1.01 (ref 1–1.03)
SQUAMOUS #/AREA URNS AUTO: 12 /HPF
TROPONIN I SERPL DL<=0.01 NG/ML-MCNC: <0.006 NG/ML (ref 0–0.03)
URN SPEC COLLECT METH UR: ABNORMAL
WBC # BLD AUTO: 8.44 K/UL (ref 3.9–12.7)
WBC #/AREA URNS AUTO: >100 /HPF (ref 0–5)
WBC CLUMPS UR QL AUTO: ABNORMAL

## 2022-07-31 PROCEDURE — 84484 ASSAY OF TROPONIN QUANT: CPT | Performed by: PHYSICIAN ASSISTANT

## 2022-07-31 PROCEDURE — 93005 ELECTROCARDIOGRAM TRACING: CPT | Performed by: INTERNAL MEDICINE

## 2022-07-31 PROCEDURE — 82010 KETONE BODYS QUAN: CPT | Performed by: PHYSICIAN ASSISTANT

## 2022-07-31 PROCEDURE — 81001 URINALYSIS AUTO W/SCOPE: CPT | Performed by: PHYSICIAN ASSISTANT

## 2022-07-31 PROCEDURE — 96365 THER/PROPH/DIAG IV INF INIT: CPT

## 2022-07-31 PROCEDURE — 99285 EMERGENCY DEPT VISIT HI MDM: CPT | Mod: 25

## 2022-07-31 PROCEDURE — 80053 COMPREHEN METABOLIC PANEL: CPT | Performed by: PHYSICIAN ASSISTANT

## 2022-07-31 PROCEDURE — 96375 TX/PRO/DX INJ NEW DRUG ADDON: CPT

## 2022-07-31 PROCEDURE — 87186 SC STD MICRODIL/AGAR DIL: CPT | Performed by: PHYSICIAN ASSISTANT

## 2022-07-31 PROCEDURE — 25000003 PHARM REV CODE 250: Performed by: PHYSICIAN ASSISTANT

## 2022-07-31 PROCEDURE — 87088 URINE BACTERIA CULTURE: CPT | Performed by: PHYSICIAN ASSISTANT

## 2022-07-31 PROCEDURE — 87077 CULTURE AEROBIC IDENTIFY: CPT | Performed by: PHYSICIAN ASSISTANT

## 2022-07-31 PROCEDURE — 63600175 PHARM REV CODE 636 W HCPCS: Performed by: PHYSICIAN ASSISTANT

## 2022-07-31 PROCEDURE — 93010 ELECTROCARDIOGRAM REPORT: CPT | Mod: ,,, | Performed by: INTERNAL MEDICINE

## 2022-07-31 PROCEDURE — 99285 PR EMERGENCY DEPT VISIT,LEVEL V: ICD-10-PCS | Mod: ,,, | Performed by: PHYSICIAN ASSISTANT

## 2022-07-31 PROCEDURE — 99285 EMERGENCY DEPT VISIT HI MDM: CPT | Mod: ,,, | Performed by: PHYSICIAN ASSISTANT

## 2022-07-31 PROCEDURE — 87086 URINE CULTURE/COLONY COUNT: CPT | Performed by: PHYSICIAN ASSISTANT

## 2022-07-31 PROCEDURE — 96366 THER/PROPH/DIAG IV INF ADDON: CPT

## 2022-07-31 PROCEDURE — 85025 COMPLETE CBC W/AUTO DIFF WBC: CPT | Performed by: PHYSICIAN ASSISTANT

## 2022-07-31 PROCEDURE — 83880 ASSAY OF NATRIURETIC PEPTIDE: CPT | Performed by: PHYSICIAN ASSISTANT

## 2022-07-31 PROCEDURE — 93010 EKG 12-LEAD: ICD-10-PCS | Mod: ,,, | Performed by: INTERNAL MEDICINE

## 2022-07-31 RX ORDER — INSULIN LISPRO 100 [IU]/ML
INJECTION, SOLUTION INTRAVENOUS; SUBCUTANEOUS
Qty: 1 EACH | Refills: 6 | Status: SHIPPED | OUTPATIENT
Start: 2022-07-31

## 2022-07-31 RX ORDER — HYDROCHLOROTHIAZIDE 25 MG/1
25 TABLET ORAL DAILY
Qty: 30 TABLET | Refills: 0 | Status: SHIPPED | OUTPATIENT
Start: 2022-07-31 | End: 2022-08-30

## 2022-07-31 RX ORDER — HYDRALAZINE HYDROCHLORIDE 20 MG/ML
10 INJECTION INTRAMUSCULAR; INTRAVENOUS
Status: COMPLETED | OUTPATIENT
Start: 2022-07-31 | End: 2022-07-31

## 2022-07-31 RX ORDER — ATORVASTATIN CALCIUM 80 MG/1
80 TABLET, FILM COATED ORAL DAILY
Qty: 30 TABLET | Refills: 0 | Status: SHIPPED | OUTPATIENT
Start: 2022-07-31 | End: 2022-08-30

## 2022-07-31 RX ORDER — CEPHALEXIN 500 MG/1
500 CAPSULE ORAL 4 TIMES DAILY
Qty: 20 CAPSULE | Refills: 0 | Status: SHIPPED | OUTPATIENT
Start: 2022-07-31 | End: 2022-08-05

## 2022-07-31 RX ORDER — ONDANSETRON 2 MG/ML
4 INJECTION INTRAMUSCULAR; INTRAVENOUS
Status: COMPLETED | OUTPATIENT
Start: 2022-07-31 | End: 2022-07-31

## 2022-07-31 RX ORDER — AMLODIPINE BESYLATE 10 MG/1
10 TABLET ORAL DAILY
Qty: 30 TABLET | Refills: 0 | Status: SHIPPED | OUTPATIENT
Start: 2022-07-31 | End: 2022-08-30

## 2022-07-31 RX ORDER — AMLODIPINE BESYLATE 10 MG/1
10 TABLET ORAL
Status: COMPLETED | OUTPATIENT
Start: 2022-07-31 | End: 2022-07-31

## 2022-07-31 RX ORDER — CARVEDILOL 3.12 MG/1
3.12 TABLET ORAL
Status: COMPLETED | OUTPATIENT
Start: 2022-07-31 | End: 2022-07-31

## 2022-07-31 RX ORDER — CARVEDILOL 3.12 MG/1
3.12 TABLET ORAL 2 TIMES DAILY
Qty: 60 TABLET | Refills: 0 | Status: SHIPPED | OUTPATIENT
Start: 2022-07-31 | End: 2022-08-30

## 2022-07-31 RX ORDER — HYDROCHLOROTHIAZIDE 25 MG/1
25 TABLET ORAL
Status: COMPLETED | OUTPATIENT
Start: 2022-07-31 | End: 2022-07-31

## 2022-07-31 RX ADMIN — ONDANSETRON 4 MG: 2 INJECTION INTRAMUSCULAR; INTRAVENOUS at 12:07

## 2022-07-31 RX ADMIN — HYDRALAZINE HYDROCHLORIDE 10 MG: 20 INJECTION, SOLUTION INTRAMUSCULAR; INTRAVENOUS at 02:07

## 2022-07-31 RX ADMIN — AMLODIPINE BESYLATE 10 MG: 10 TABLET ORAL at 12:07

## 2022-07-31 RX ADMIN — CARVEDILOL 3.12 MG: 3.12 TABLET, FILM COATED ORAL at 12:07

## 2022-07-31 RX ADMIN — CEFTRIAXONE 1 G: 1 INJECTION, SOLUTION INTRAVENOUS at 01:07

## 2022-07-31 RX ADMIN — HYDROCHLOROTHIAZIDE 25 MG: 25 TABLET ORAL at 12:07

## 2022-07-31 RX ADMIN — SODIUM CHLORIDE 1000 ML: 0.9 INJECTION, SOLUTION INTRAVENOUS at 12:07

## 2022-07-31 NOTE — ED PROVIDER NOTES
"Encounter Date: 2022       History     Chief Complaint   Patient presents with    Hyperglycemia     Had stomach virus a week ago and "sugar and blood pressure has been high since." Confirms not taking BP medicine because "I don't have any"     61-year-old female with past medical history of CAD, DM, hypertension, hyperlipidemia presents to the emergency department with chief complaint of hyperglycemia. She reports that she was diagnosed with a viral syndrome about one week ago. Her symptoms have resolved however she blood sugars have been labile. Ranging in the 200s. Reports compliance with insulin. However, she does admit that she has been out of all of her medications for over a year and has been relying on her 's insulin. She reports feeling nauseous this morning and has had mild dysuria/frequency. Denies cp, sob, abd pain, vomiting, diarrhea. She has not taken any medication for her symptoms. Denies worsening or alleviating factors.         Review of patient's allergies indicates:  No Known Allergies  Past Medical History:   Diagnosis Date    CAD (coronary artery disease)     CAD, multiple vessel 10/28/2014    Diabetes mellitus     Encounter for blood transfusion     Hyperlipidemia     Hypertension      Past Surgical History:   Procedure Laterality Date    CARDIAC CATHETERIZATION  05/22/15     x 1    CARDIAC SURGERY       SECTION      CORONARY ARTERY BYPASS GRAFT  12/2014    x 2     Family History   Problem Relation Age of Onset    Diabetes Mother     Hypertension Mother     Diabetes Brother     Heart disease Neg Hx     Heart attack Neg Hx     Cirrhosis Neg Hx      Social History     Tobacco Use    Smoking status: Never Smoker    Smokeless tobacco: Never Used   Substance Use Topics    Alcohol use: No    Drug use: No     Review of Systems   Constitutional: Negative for fever.   HENT: Negative for sore throat.    Respiratory: Negative for shortness of breath.  "   Cardiovascular: Negative for chest pain.   Gastrointestinal: Positive for nausea.   Genitourinary: Positive for dysuria and frequency.   Musculoskeletal: Negative for back pain.   Skin: Negative for rash.   Neurological: Negative for weakness.   Hematological: Does not bruise/bleed easily.       Physical Exam     Initial Vitals   BP Pulse Resp Temp SpO2   07/31/22 1119 07/31/22 1119 07/31/22 1119 07/31/22 1120 07/31/22 1119   (!) 226/103 (!) 119 20 98.5 °F (36.9 °C) 98 %      MAP       --                Physical Exam    Nursing note and vitals reviewed.  Constitutional: She appears well-developed and well-nourished. She is not diaphoretic. No distress.   HENT:   Head: Normocephalic and atraumatic.   Mouth/Throat: Oropharynx is clear and moist.   Eyes: Conjunctivae and EOM are normal. Pupils are equal, round, and reactive to light.   Neck: Neck supple.   Normal range of motion.  Cardiovascular: Normal rate, regular rhythm, normal heart sounds and intact distal pulses. Exam reveals no gallop and no friction rub.    No murmur heard.  Pulmonary/Chest: Breath sounds normal. She has no wheezes. She has no rhonchi. She has no rales.   Abdominal: Abdomen is soft. Bowel sounds are normal. There is no abdominal tenderness.   Musculoskeletal:         General: Normal range of motion.      Cervical back: Normal range of motion and neck supple.     Neurological: She is alert and oriented to person, place, and time. She has normal strength. No cranial nerve deficit or sensory deficit. GCS score is 15. GCS eye subscore is 4. GCS verbal subscore is 5. GCS motor subscore is 6.   Skin: Skin is warm and dry. Capillary refill takes less than 2 seconds.   Psychiatric: She has a normal mood and affect. Her behavior is normal. Judgment and thought content normal.         ED Course   Procedures  Labs Reviewed   CBC W/ AUTO DIFFERENTIAL - Abnormal; Notable for the following components:       Result Value    MCH 25.8 (*)     MCHC 30.7 (*)      Gran % 73.8 (*)     All other components within normal limits   COMPREHENSIVE METABOLIC PANEL - Abnormal; Notable for the following components:    CO2 19 (*)     Glucose 256 (*)     Albumin 3.3 (*)     eGFR if  56.8 (*)     eGFR if non  49.2 (*)     All other components within normal limits   URINALYSIS, REFLEX TO URINE CULTURE - Abnormal; Notable for the following components:    Appearance, UA Cloudy (*)     Protein, UA 2+ (*)     Ketones, UA Trace (*)     Occult Blood UA 1+ (*)     Leukocytes, UA 2+ (*)     All other components within normal limits    Narrative:     Specimen Source->Urine   BETA - HYDROXYBUTYRATE, SERUM - Abnormal; Notable for the following components:    Beta-Hydroxybutyrate 1.1 (*)     All other components within normal limits   URINALYSIS MICROSCOPIC - Abnormal; Notable for the following components:    WBC, UA >100 (*)     WBC Clumps, UA Moderate (*)     Bacteria Many (*)     Hyaline Casts, UA 8 (*)     All other components within normal limits    Narrative:     Specimen Source->Urine   POCT GLUCOSE - Abnormal; Notable for the following components:    POCT Glucose 187 (*)     All other components within normal limits   CULTURE, URINE   TROPONIN I   B-TYPE NATRIURETIC PEPTIDE     EKG Readings: (Independently Interpreted)   Initial Reading: No STEMI. Rhythm: Normal Sinus Rhythm. Heart Rate: 79.       Imaging Results          X-Ray Chest PA And Lateral (Final result)  Result time 07/31/22 13:16:19    Final result by Scott Ugarte MD (07/31/22 13:16:19)                 Impression:      No convincing evidence of acute cardiopulmonary disease.      Electronically signed by: Scott Ugarte  Date:    07/31/2022  Time:    13:16             Narrative:    EXAMINATION:  XR CHEST PA AND LATERAL    CLINICAL HISTORY:  Nausea    TECHNIQUE:  PA and lateral views of the chest were performed.    COMPARISON:  Chest radiograph 03/27/2020.    FINDINGS:  Status post median  sternotomy.  Grossly unchanged cardiomediastinal contours.  Lungs appear essentially clear.  No definite pneumothorax or large volume pleural effusion.    No acute findings identified in the visualized abdomen.  Osseous and soft tissue structures appear without definite acute change.                                 Medications   sodium chloride 0.9% bolus 1,000 mL (0 mLs Intravenous Stopped 7/31/22 1440)   ondansetron injection 4 mg (4 mg Intravenous Given 7/31/22 1240)   amLODIPine tablet 10 mg (10 mg Oral Given 7/31/22 1240)   carvediloL tablet 3.125 mg (3.125 mg Oral Given 7/31/22 1240)   hydroCHLOROthiazide tablet 25 mg (25 mg Oral Given 7/31/22 1240)   cefTRIAXone (ROCEPHIN) 1 g/50 mL D5W IVPB (0 g Intravenous Stopped 7/31/22 1440)   hydrALAZINE injection 10 mg (10 mg Intravenous Given 7/31/22 1449)     Medical Decision Making:   History:   Old Medical Records: I decided to obtain old medical records.  Initial Assessment:   Emergent evaluation of a 61-year-old female who presents to the emergency department with chief complaint of hyperglycemia.  She was diagnosed with a viral syndrome about a week ago.  Her symptoms have resolved but her blood sugar remains high.  She is concerned that she may have an infection.  She does admit to urinary frequency and mild dysuria.  Patient is afebrile, tachycardic, hypertensive, and nontoxic appearing.  Will order labs, imaging, EKG, and continue to monitor.  Differential Diagnosis:   Differential diagnosis includes but is not limited to pneumonia, urinary tract infection, DKA, hyperglycemia, metabolic derangement.   Independently Interpreted Test(s):   I have ordered and independently interpreted X-rays - see prior notes.  I have ordered and independently interpreted EKG Reading(s) - see prior notes  Clinical Tests:   Lab Tests: Ordered and Reviewed  Radiological Study: Ordered and Reviewed  Medical Tests: Ordered and Reviewed  ED Management:  UA concerning for infection.   Will treat with Rocephin and discharged with Keflex.  No leukocytosis.  H/H stable.  CO2 19. Anion gap normal.  Glucose 256. Creatinine baseline.  Troponin negative.  BNP within normal limits.  Beta hydroxybutyrate at 1.1.  Lab work is not indicative of DKA.  Chest x-ray without acute abnormality.  Patient was given her antihypertensive medications along with 1 dose of IV hydralazine.  Her blood pressure has since normalized.  Will discharge with all of her prescriptions.  Will place ambulatory referral for Internal Medicine follow-up.  Extensive return precautions discussed.  Patient voices understanding.  All questions answered.  The patient was instructed to follow up with a primary care provider or to return to the emergency department for worsening symptoms. The treatment plan was discussed with the patient who demonstrated understanding and comfort with plan. The patient's history, physical exam, and plan of care was discussed with and agreed upon with my supervising physician.                ED Course as of 07/31/22 1950   Sun Jul 31, 2022   1244 WBC: 8.44 [JM]   1244 Hemoglobin: 13.0 [JM]   1244 Hematocrit: 42.3 [JM]   1244 Platelets: 417 [JM]   1244 WBC, UA(!): >100 [JM]   1244 WBC Clumps, UA(!): Moderate [JM]   1244 Bacteria, UA(!): Many [JM]   1244 Occult Blood UA(!): 1+ [JM]   1244 Leukocytes, UA(!): 2+ [JM]   1244 Beta-Hydroxybutyrate(!): 1.1 [JM]   1321 CO2(!): 19 [JM]   1322 Glucose(!): 256 [JM]   1322 BUN: 10 [JM]   1322 Creatinine: 1.2 [JM]   1322 Anion Gap: 13 [JM]      ED Course User Index  [JM] Etta Valencia PA-C             Clinical Impression:   Final diagnoses:  [I10] Hypertension (Primary)  [R11.0] Nausea  [R73.9] Hyperglycemia  [Z76.0] Medication refill  [N30.01] Acute cystitis with hematuria          ED Disposition Condition    Discharge Stable        ED Prescriptions     Medication Sig Dispense Start Date End Date Auth. Provider    amLODIPine (NORVASC) 10 MG tablet Take 1 tablet (10  mg total) by mouth once daily. 30 tablet 7/31/2022 8/30/2022 Etta Valencia PA-C    atorvastatin (LIPITOR) 80 MG tablet Take 1 tablet (80 mg total) by mouth once daily. DO NOT TAKE UNTIL FOLLOW UP WITH HEPATOLOGY. 30 tablet 7/31/2022 8/30/2022 Etta Valencia PA-C    carvediloL (COREG) 3.125 MG tablet Take 1 tablet (3.125 mg total) by mouth 2 (two) times daily. 60 tablet 7/31/2022 8/30/2022 Etta Valencia PA-C    hydroCHLOROthiazide (HYDRODIURIL) 25 MG tablet Take 1 tablet (25 mg total) by mouth once daily. 30 tablet 7/31/2022 8/30/2022 Etta Valencia PA-C    insulin lispro (HUMALOG KWIKPEN INSULIN) 100 unit/mL pen INJECT 4 UNITS INTO THE SKIN WITH EACH MEAL PLUS CORRECTION SCALE (180-230+1, 231-280+2, 281-330+3, 331-380+4, > 380+5 1 each 7/31/2022  Etta Valencia PA-C    cephALEXin (KEFLEX) 500 MG capsule Take 1 capsule (500 mg total) by mouth 4 (four) times daily. for 5 days 20 capsule 7/31/2022 8/5/2022 Etta Valencia PA-C        Follow-up Information     Follow up With Specialties Details Why Contact Info Additional Information    [facility] - Emergency Dept Emergency Medicine Go to  If symptoms worsen [address]  [facility] [address] [phone]     [facility] Internal Medicine Schedule an appointment as soon as possible for a visit in 1 week  [address]  [facility] [address] [phone] [facility] Please park in surface lot and check in at central registration desk           Etta Valencia PA-C  07/31/22 1950

## 2022-07-31 NOTE — DISCHARGE INSTRUCTIONS
You have a urinary tract infection.  Please take Keflex as prescribed and to completion.  Your lab work shows elevated blood sugar, but is otherwise near your baseline.  Your blood pressure was elevated today, although there are no signs of end-organ damage on your workup.  I have refilled all of your blood pressure medications.  Please take them as prescribed.  Keep a blood pressure journal.  I have also placed a referral for you to follow-up with our Internal Medicine Clinic.  Follow-up with your primary doctor or return to the emergency department sooner for any new or worsening symptoms.

## 2022-08-02 LAB — BACTERIA UR CULT: ABNORMAL

## 2023-01-01 NOTE — ED PROVIDER NOTES
Encounter Date: 3/27/2020       History     Chief Complaint   Patient presents with    Cough     Patient arrives for evaluation of fever of 100.1 last night with cough x 2 days - positive exposure to Covid-19 at work    Fever     #[phone]     59-year-old female with history of diabetes, CAD, hypertension, presents to the ER with chief complaint of fever and cough for 2 days.  Patient reports temp of 100° at home.  She works at [email or web address] and has exposure to a suspected COVID + patient.   Patient denies shortness of breath or chest pain, nausea, vomiting, diarrhea, syncope, dizziness.  Patient states that she has been out of her blood pressure medications for a while and is requesting refill.        Review of patient's allergies indicates:   Allergen Reactions    Pneumococcal [id number] ps vaccine      Past Medical History:   Diagnosis Date    CAD (coronary artery disease)     CAD, multiple vessel 10/28/2014    Diabetes mellitus     Encounter for blood transfusion     Hyperlipidemia     Hypertension      Past Surgical History:   Procedure Laterality Date    CARDIAC CATHETERIZATION  05/22/15     x 1    CARDIAC SURGERY       SECTION      CORONARY ARTERY BYPASS GRAFT  12/2014    x 2     Family History   Problem Relation Age of Onset    Diabetes Mother     Hypertension Mother     Diabetes Brother     Heart disease Neg Hx     Heart attack Neg Hx     Cirrhosis Neg Hx      Social History     Tobacco Use    Smoking status: Never Smoker   Substance Use Topics    Alcohol use: No    Drug use: No     Review of Systems   Constitutional: Positive for chills and fever. Negative for activity change, appetite change and fatigue.   HENT: Negative for sore throat.    Respiratory: Positive for cough. Negative for shortness of breath.    Cardiovascular: Negative for chest pain.   Gastrointestinal: Negative for abdominal pain, nausea and vomiting.   Genitourinary: Negative for dysuria.   Musculoskeletal:  "Negative for back pain.   Skin: Negative for rash.   Neurological: Negative for dizziness, weakness and light-headedness.   Hematological: Does not bruise/bleed easily.   Psychiatric/Behavioral: Negative for confusion.       Physical Exam     Initial Vitals   BP Pulse Resp Temp SpO2   03/27/20 1216 03/27/20 1216 03/27/20 1237 03/27/20 1216 03/27/20 1216   (!) 222/100 101 18 98.6 °F (37 °C) 96 %      MAP       --                Vitals:    03/27/20 1216 03/27/20 1237 03/27/20 1424 03/27/20 1527   BP: (!) 222/100 (!) 189/85 (!) 192/87    Pulse: 101 90 83    Resp:  18 16    Temp: 98.6 °F (37 °C) 98.7 °F (37.1 °C) (!) 100.5 °F (38.1 °C) (!) 100.5 °F (38.1 °C)   TempSrc: Oral Oral Oral    SpO2: 96% 98% 98%    Weight:  90.7 kg (200 lb)     Height:  5' 8" (1.727 m)      03/27/20 1538   BP: (!) 212/94   Pulse: 80   Resp: 16   Temp: 98.9 °F (37.2 °C)   TempSrc: Oral   SpO2: 98%   Weight:    Height:        Physical Exam    Nursing note and vitals reviewed.  Constitutional: She appears well-developed and well-nourished.   HENT:   Head: Atraumatic.   Mouth/Throat: Oropharynx is clear and moist.   Eyes: Conjunctivae and EOM are normal. Pupils are equal, round, and reactive to light.   Neck: Normal range of motion. Neck supple.   Cardiovascular: Normal rate, regular rhythm and intact distal pulses.   Pulmonary/Chest: Breath sounds normal. No respiratory distress. She has no wheezes. She has no rhonchi. She has no rales.   Neurological: She is alert and oriented to person, place, and time. She has normal strength.   Skin: No rash noted.   Psychiatric: She has a normal mood and affect.         ED Course   Procedures  Labs Reviewed   SARS-COV-2 (COVID-19) QUALITATIVE PCR          Imaging Results          X-Ray Chest AP Portable (Final result)  Result time 03/27/20 13:31:50    Final result by Ermelinda Avina MD (03/27/20 13:31:50)                 Impression:      No acute cardiopulmonary abnormality.      Electronically signed " by: Ermelnida Avina  Date:    03/27/2020  Time:    13:31             Narrative:    EXAMINATION:  XR CHEST AP PORTABLE    CLINICAL HISTORY:  Cough    TECHNIQUE:  Single view of the chest was obtained.    COMPARISON:  Multiple priors, most recent 10/17/2015    FINDINGS:  Median sternotomy wires are intact and aligned.    Normal cardiomediastinal contour. No focal consolidation, pleural effusion or pneumothorax.                                       APC / Resident Notes:   Patient presenting with symptoms of viral URI; appears well and nontoxic.  Exam grossly unremarkable at this time.    DIFFERENTIAL DIAGNOSIS: viral URI, viral syndrome, COVID 19,  bacterial/viral pneumonia, sepsis.    ED management: Patients chest xray is negative for acute or infectious process.  Due to patient co morbidities, will obtain COVID19 testing.   Given general illness symptoms I have advised that she should self quarantine for 2 weeks.     Reinforced this advice and the dangers failing to comply presents to the public. Instructed patient on fever and symptom control.Return precautions discussed. Vital signs did not indicate sepsis and patient was well appearing, okay for discharge home for quarantine.     I have placed order for our home symptom monitoring program via text and patient is aware.     The patient's blood pressure is elevated here in the emergency department.  She has a history of hypertension and and this is likely long-standing uncontrolled hypertension and noncompliance with blood pressure medications.  Based on the patient's presentation today I do not see any signs of endorgan damage representing hypertensive urgency or emergency.  I do not think the patient's presentation necessitates further intervention in the Emergency Department to acutely decrease her blood pressure. Will restart her Norvasc 10 mg.  It is unclear by her chart which second agent she is on so I have advised that she discuss further prescription with her  PCP.   Patient is comfortable with this plan.  She is given strict ER return precautions and advised to follow up with PCP asap        COVID Statement  The  of Health and Human Services and Mack Benitez, Governor of the [facility], have declared a State of Public Health Emergency due to the spread of a novel coronavirus and disease (COVID-19).  There is no currently accepted treatment except conservative measures and respiratory support if appropriate.  This has lead to significant resource capacity and potential delays in care.                            Clinical Impression:       ICD-10-CM ICD-9-CM   1. Fever, unspecified fever cause R50.9 780.60   2. Cough R05 786.2   3. Suspected Covid-19 Virus Infection R68.89    4. Acute viral syndrome B34.9 079.99   5. Hypertension, unspecified type I10 401.9             ED Disposition Condition    Discharge Stable        ED Prescriptions     Medication Sig Dispense Start Date End Date Auth. Provider    acetaminophen (TYLENOL) 500 MG tablet Take 2 tablets (1,000 mg total) by mouth every 8 (eight) hours. for 7 days  3/27/2020 4/3/2020 ZULMA Merlos    amLODIPine (NORVASC) 10 MG tablet Take 1 tablet (10 mg total) by mouth once daily. 30 tablet 3/27/2020 3/27/2021 ZULMA Merlos    dextromethorphan-guaifenesin  mg/5 ml (ROBITUSSIN-DM)  mg/5 mL liquid Take 5 mLs by mouth 4 (four) times daily as needed (cough). 120 mL 3/27/2020 4/6/2020 ZULMA Merlos        Follow-up Information     Follow up With Specialties Details Why Contact Info    Gerard Joseph Jr., MD Internal Medicine Call in 1 day To discuss ER visit and schedule follow up appointment within 1 week [address] [facility]  [place] [id number] [phone]                                       ZULMA Merlos  03/27/20 2917     Statement Selected